# Patient Record
Sex: FEMALE | Race: WHITE | NOT HISPANIC OR LATINO
[De-identification: names, ages, dates, MRNs, and addresses within clinical notes are randomized per-mention and may not be internally consistent; named-entity substitution may affect disease eponyms.]

---
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
## 2018-12-21 ENCOUNTER — APPOINTMENT (OUTPATIENT)
Dept: OPHTHALMOLOGY | Facility: CLINIC | Age: 18
End: 2018-12-21

## 2019-06-26 ENCOUNTER — HOSPITAL ENCOUNTER (EMERGENCY)
Dept: HOSPITAL 25 - UCEAST | Age: 19
Discharge: HOME | End: 2019-06-26
Payer: COMMERCIAL

## 2019-06-26 VITALS — DIASTOLIC BLOOD PRESSURE: 83 MMHG | SYSTOLIC BLOOD PRESSURE: 127 MMHG

## 2019-06-26 DIAGNOSIS — A69.20: Primary | ICD-10-CM

## 2019-06-26 PROCEDURE — G0463 HOSPITAL OUTPT CLINIC VISIT: HCPCS

## 2019-06-26 PROCEDURE — 99202 OFFICE O/P NEW SF 15 MIN: CPT

## 2019-06-26 NOTE — UC
Skin Complaint HPI





- HPI Summary


HPI Summary: 





17 yo female with anular lesion on left forearm





She works out of doors in [place]





no known tick bite





noted rash 2 days ago





It has growing steadily and now has a bulls eye appearance

















- History of Current Complaint


Chief Complaint: UCRash


Time Seen by Provider: 06/26/19 20:37


Stated Complaint: RASH


Hx Obtained From: Patient


Hx Last Menstrual Period: 6/21/19


Onset/Duration: Gradual Onset, Lasting Days


Timing: Constant


Current Severity: None


Pain Intensity: 0


Pain Scale Used: 0-10 Numeric


Character: Pruritus - slight, Redness


Aggravating Factor(s): Nothing


Alleviating Factor(s): Nothing


Associated Signs & Symptoms: Positive: Rash





- Allergy/Home Medications


Allergies/Adverse Reactions: 


 Allergies











Allergy/AdvReac Type Severity Reaction Status Date / Time


 


Penicillins Allergy Intermediate Hives Verified 06/26/19 20:42


 


sulfamethoxazole Allergy Intermediate Hives Verified 06/26/19 20:42





[From Bactrim]     


 


trimethoprim [From Bactrim] Allergy Intermediate Hives Verified 06/26/19 20:42











Home Medications: 


 Home Medications





Desogestrel-Ethinyl Estradiol [Cyred 0.15-30 mg-Mcg] 1 tab PO DAILY WITH MEAL 06 /26/19 [History Confirmed 06/26/19]











PMH/Surg Hx/FS Hx/Imm Hx


Previously Healthy: Yes





- Surgical History


Surgical History: Yes


Surgery Procedure, Year, and Place: eye bilaeral





- Family History


Known Family History: 


   Negative: Cardiac Disease, Hypertension, Diabetes





- Social History


Alcohol Use: Occasionally


Substance Use Type: None


Smoking Status (MU): Never Smoked Tobacco





Review of Systems


All Other Systems Reviewed And Are Negative: Yes


Constitutional: Positive: Negative


Skin: Positive: Rash


Eyes: Positive: Negative


ENT: Positive: Negative


Respiratory: Positive: Negative


Cardiovascular: Positive: Negative


Gastrointestinal: Positive: Negative


Genitourinary: Positive: Negative


Motor: Positive: Negative


Neurovascular: Positive: Negative


Musculoskeletal: Positive: Negative


Neurological: Positive: Negative


Psychological: Positive: Negative





Physical Exam


Triage Information Reviewed: Yes


Appearance: Well-Appearing, No Pain Distress, Well-Nourished


Vital Signs: 


 Initial Vital Signs











Temp  98.2 F   06/26/19 20:37


 


Pulse  80   06/26/19 20:37


 


Resp  18   06/26/19 20:37


 


BP  127/83   06/26/19 20:37


 


Pulse Ox  100   06/26/19 20:37











Vital Signs Reviewed: Yes


Eyes: Positive: Conjunctiva Clear


ENT: Positive: Hearing grossly normal.  Negative: Nasal congestion, Nasal 

drainage, Trismus, Muffled voice, Hoarse voice


Neck: Positive: Supple, Nontender, No Lymphadenopathy


Respiratory: Positive: Lungs clear, Normal breath sounds


Cardiovascular: Positive: RRR, No Murmur


Musculoskeletal: Positive: ROM Intact, No Edema


Psychological Exam: Normal


Skin Exam: Other - anular ringed lesion of left forearm approx 5x6cm





Course/Dx





- Diagnoses


Provider Diagnosis: 


 Erythema migrans (Lyme disease)








Discharge





- Sign-Out/Discharge


Documenting (check all that apply): Patient Departure


All imaging exams completed and their final reports reviewed: No Studies





- Discharge Plan


Condition: Stable


Disposition: HOME


Prescriptions: 


DOXYcycline CAP(*) [DOXYcycline 100MG CAP(*)] 100 mg PO BID #26 cap


Patient Education Materials:  Lyme Disease (ED)


Referrals: 


No Primary Care Phys,NOPCP [Primary Care Provider] - 


Additional Instructions: 


your rash is worrisome for ERTHYEMA MIGRANS





we will treat you for presumed lyme disease





- Billing Disposition and Condition


Condition: STABLE


Disposition: Home

## 2020-12-22 ENCOUNTER — APPOINTMENT (OUTPATIENT)
Dept: OPHTHALMOLOGY | Facility: CLINIC | Age: 20
End: 2020-12-22

## 2022-01-20 ENCOUNTER — NON-APPOINTMENT (OUTPATIENT)
Age: 22
End: 2022-01-20

## 2022-01-20 ENCOUNTER — APPOINTMENT (OUTPATIENT)
Dept: OPHTHALMOLOGY | Facility: CLINIC | Age: 22
End: 2022-01-20
Payer: COMMERCIAL

## 2022-01-20 PROCEDURE — 92004 COMPRE OPH EXAM NEW PT 1/>: CPT
